# Patient Record
Sex: FEMALE | URBAN - METROPOLITAN AREA
[De-identification: names, ages, dates, MRNs, and addresses within clinical notes are randomized per-mention and may not be internally consistent; named-entity substitution may affect disease eponyms.]

---

## 2021-12-14 ENCOUNTER — OFFICE VISIT (OUTPATIENT)
Dept: URBAN - METROPOLITAN AREA CLINIC 56 | Facility: CLINIC | Age: 41
End: 2021-12-14

## 2021-12-14 DIAGNOSIS — H11.151 PINGUECULA, RIGHT EYE: ICD-10-CM

## 2021-12-14 DIAGNOSIS — H04.123 TEAR FILM INSUFFICIENCY OF BILATERAL LACRIMAL GLANDS: ICD-10-CM

## 2021-12-14 DIAGNOSIS — H11.042 PERIPHERAL PTERYGIUM, STATIONARY, LEFT EYE: Primary | ICD-10-CM

## 2021-12-14 DIAGNOSIS — H25.13 AGE-RELATED NUCLEAR CATARACT, BILATERAL: ICD-10-CM

## 2021-12-14 PROCEDURE — 92004 COMPRE OPH EXAM NEW PT 1/>: CPT | Performed by: OPTOMETRIST

## 2021-12-14 PROCEDURE — 92134 CPTRZ OPH DX IMG PST SGM RTA: CPT | Performed by: OPTOMETRIST

## 2021-12-14 ASSESSMENT — KERATOMETRY
OD: 42.95
OS: 43.19

## 2021-12-14 ASSESSMENT — INTRAOCULAR PRESSURE
OS: 16
OD: 16

## 2021-12-14 ASSESSMENT — VISUAL ACUITY
OD: 20/20
OS: 20/20

## 2021-12-14 NOTE — IMPRESSION/PLAN
Impression: Tear film insufficiency of bilateral lacrimal glands: H04.123. Plan: Explained that there are three layers to the tear film (oil, mucous, and water) and an imbalance in any of these layers can result in dry eyes. This can result in decreased or fluctuating vision. Pt reviewed that there is no cure and daily maintenance is needed. Artificial tears QID or more OU recommended. Discussed with patient they may use them as often as needed, and cannot abuse ATs use. Also discussed with patient that if they are trying one type and it doesn't seem to be helping, to try others until they find what is best for them. Warm compresses twice daily ~ 10 minutes with a Rahat Mask, Hypochlorous Acid Lid scrubs and ocular lubricants.

## 2021-12-14 NOTE — IMPRESSION/PLAN
Impression: Peripheral pterygium, stationary, left eye: H11.042. Plan: Discussed findings and diagnosis. Recommended frequent artificial tears for lubrication and UV protected sunglasses. Patient is requesting consult to have it removed.